# Patient Record
Sex: FEMALE | Race: WHITE | URBAN - METROPOLITAN AREA
[De-identification: names, ages, dates, MRNs, and addresses within clinical notes are randomized per-mention and may not be internally consistent; named-entity substitution may affect disease eponyms.]

---

## 2021-08-27 ENCOUNTER — OFFICE VISIT (OUTPATIENT)
Dept: URBAN - METROPOLITAN AREA CLINIC 33 | Facility: CLINIC | Age: 56
End: 2021-08-27

## 2021-08-27 DIAGNOSIS — H25.812 COMBINED FORMS OF AGE-RELATED CATARACT, LEFT EYE: ICD-10-CM

## 2021-08-27 PROCEDURE — 99204 OFFICE O/P NEW MOD 45 MIN: CPT | Performed by: OPHTHALMOLOGY

## 2021-08-27 ASSESSMENT — INTRAOCULAR PRESSURE
OS: 12
OD: 12

## 2021-08-27 ASSESSMENT — KERATOMETRY
OD: 42.50
OS: 42.88

## 2021-08-27 ASSESSMENT — VISUAL ACUITY
OD: 20/400
OS: 20/20

## 2021-08-27 NOTE — IMPRESSION/PLAN
Impression: Combined forms of age-related cataract, bilateral: H25.813. Condition: quality of life issue. Symptoms: could improve with surgery. Vision: vision affected. Plan: Cataracts account for the patient's complaints. Discussed all risks, benefits, procedures and recovery. Patient has quality of life issues. Patient understands changing glasses will not improve vision. Patient desires to have surgery, recommend phacoemulsification with intraocular lens. CE w/IOL OD then OS. R/B/A discussed. RL2. Lens: standard   Aim: plano. Dexycu  will be used.

## 2021-09-29 ENCOUNTER — TESTING ONLY (OUTPATIENT)
Dept: URBAN - METROPOLITAN AREA CLINIC 33 | Facility: CLINIC | Age: 56
End: 2021-09-29

## 2021-09-29 DIAGNOSIS — H25.813 COMBINED FORMS OF AGE-RELATED CATARACT, BILATERAL: Primary | ICD-10-CM

## 2021-09-29 PROCEDURE — 92025 CPTRIZED CORNEAL TOPOGRAPHY: CPT | Performed by: OPHTHALMOLOGY

## 2021-09-29 ASSESSMENT — PACHYMETRY
OS: 3.58
OS: 25.52
OD: 25.81
OD: 3.47

## 2021-10-11 ENCOUNTER — SURGERY (OUTPATIENT)
Dept: URBAN - METROPOLITAN AREA SURGERY 15 | Facility: SURGERY | Age: 56
End: 2021-10-11
Payer: COMMERCIAL

## 2021-10-11 PROCEDURE — 66984 XCAPSL CTRC RMVL W/O ECP: CPT | Performed by: OPHTHALMOLOGY

## 2021-10-12 ENCOUNTER — POST-OPERATIVE VISIT (OUTPATIENT)
Dept: URBAN - METROPOLITAN AREA CLINIC 33 | Facility: CLINIC | Age: 56
End: 2021-10-12

## 2021-10-12 ASSESSMENT — INTRAOCULAR PRESSURE
OD: 17
OS: 15

## 2021-10-12 NOTE — IMPRESSION/PLAN
Impression: S/P Cataract Extraction by phacoemulsification with IOL placement; ORA; DEXYCU OD - 1 Day. Encounter for surgical aftercare following surgery on a sense organ  Z48.810. Post operative instructions reviewed - Condition is improving. Discussed mild PC haze OD- will continue to monitor. Dilate OD at next appointment. Plan: --Advised patient to use artificial tears for comfort.

## 2021-10-19 ENCOUNTER — POST-OPERATIVE VISIT (OUTPATIENT)
Dept: URBAN - METROPOLITAN AREA CLINIC 33 | Facility: CLINIC | Age: 56
End: 2021-10-19

## 2021-10-19 DIAGNOSIS — Z48.810 ENCOUNTER FOR SURGICAL AFTERCARE FOLLOWING SURGERY ON A SENSE ORGAN: Primary | ICD-10-CM

## 2021-10-19 ASSESSMENT — INTRAOCULAR PRESSURE
OD: 11
OS: 13

## 2021-10-19 ASSESSMENT — VISUAL ACUITY: OD: 20/30

## 2021-10-19 NOTE — IMPRESSION/PLAN
Impression: S/P Cataract Extraction by phacoemulsification with IOL placement; ORA; DEXYCU OD - 8 Days. Encounter for surgical aftercare following surgery on a sense organ  Z48.810. Excellent post op course   Post operative instructions reviewed - Patient happy with vision OD Plan: Keep CE OS --Advised patient to use artificial tears for comfort.

## 2021-10-25 ENCOUNTER — SURGERY (OUTPATIENT)
Dept: URBAN - METROPOLITAN AREA SURGERY 15 | Facility: SURGERY | Age: 56
End: 2021-10-25
Payer: COMMERCIAL

## 2021-10-25 PROCEDURE — 66984 XCAPSL CTRC RMVL W/O ECP: CPT | Performed by: OPHTHALMOLOGY

## 2021-10-26 ENCOUNTER — POST-OPERATIVE VISIT (OUTPATIENT)
Dept: URBAN - METROPOLITAN AREA CLINIC 32 | Facility: CLINIC | Age: 56
End: 2021-10-26

## 2021-10-26 ASSESSMENT — INTRAOCULAR PRESSURE
OD: 15
OS: 20

## 2021-10-26 NOTE — IMPRESSION/PLAN
Impression: S/P Cataract Extraction by phacoemulsification with IOL placement; ORA; Dexycu OS - 1 Day. Presence of intraocular lens  Z96.1. Post operative instructions reviewed - Plan: The surgical eye(s) is improving well. Continue to follow current drop plan and post operative instructions. Recommend artificial tears throughout post operative period. RTC for scheduled follow up. Check [facility]. --Advised patient to use artificial tears for comfort.

## 2021-11-02 ENCOUNTER — POST-OPERATIVE VISIT (OUTPATIENT)
Dept: URBAN - METROPOLITAN AREA CLINIC 33 | Facility: CLINIC | Age: 56
End: 2021-11-02

## 2021-11-02 DIAGNOSIS — Z96.1 PRESENCE OF INTRAOCULAR LENS: Primary | ICD-10-CM

## 2021-11-02 DIAGNOSIS — H26.491 OTHER SECONDARY CATARACT, RIGHT EYE: ICD-10-CM

## 2021-11-02 ASSESSMENT — VISUAL ACUITY
OD: 20/25-
OS: 20/20

## 2021-11-02 ASSESSMENT — INTRAOCULAR PRESSURE
OD: 11
OS: 13

## 2021-11-02 NOTE — IMPRESSION/PLAN
Impression: S/P Cataract Extraction by phacoemulsification with IOL placement; ORA; Dexycu OS - 8 Days. Presence of intraocular lens  Z96.1. Excellent post op course   Post operative instructions reviewed - Condition is improving - Opacified Capsule OU. Plan: Discussed PCO OD. Recommend YAG OD. R/B/A discussed and understood by patient and patient elects to proceed as indicated. RL-2 --Advised patient to use artificial tears for comfort.

## 2021-11-16 ENCOUNTER — SURGERY (OUTPATIENT)
Dept: URBAN - METROPOLITAN AREA SURGERY 15 | Facility: SURGERY | Age: 56
End: 2021-11-16

## 2021-11-16 PROCEDURE — 66821 AFTER CATARACT LASER SURGERY: CPT | Performed by: OPHTHALMOLOGY

## 2023-01-11 NOTE — IMPRESSION/PLAN
Impression: Horseshoe tear of retina without detachment, left eye: H33.312. Left. Condition: new problem addtl w/u needed. Vision: vision affected. Plan: Discussed diagnosis with patient. Gonio OS shows sup lattice, chorioretinal scar, 2 holes temp 3 o'clock lattice, inf VH. Discussed risks of progression. Recommend Laser Photocoagulation LEFT EYE for Retinal Tear Repair to reduce the risk of Retinal Detachment. Discussed the risks and benefits of laser treatment. All questions answered. Advised patient to come in ASAP if she notices any vision loss. No restrictions after laser tx. Patient elects to proceed with recommendation. RL1. Will proceed w/ PCA OS today.

## 2023-01-11 NOTE — IMPRESSION/PLAN
Impression: Vitreous degeneration, right eye: H43.811. Plan: Patient educated on ocular findings. Discussed symptoms of retinal detachment. Recommended patient to return to clinic if he/she notes new onset of floaters, flashes of light, and/or curtain of veil over vision immediately. Continue to monitor.

## 2023-01-11 NOTE — IMPRESSION/PLAN
Impression: Presence of intraocular lens: Z96.1 OS. Plan: Patient educated on findings. Continue to monitor.

## 2023-01-11 NOTE — IMPRESSION/PLAN
Impression: Unspecified retinal break, right eye: H33.301. -optos taken
-temporal retinal break
-questionable inferior retinal break Plan: Patient educated on ocular findings. Discussed the natural history and the risks and benefit of repairing retinal break. Patient will see retina specialist today. . Discussed symptoms of retinal detachment. Recommended patient to return to clinic if he/she notes new onset of floaters, flashes of light, and/or curtain of veil over vision immediately.

## 2023-01-11 NOTE — IMPRESSION/PLAN
Impression: Puckering of macula, right eye: H35.371.
-MAC OCT ordered showing ERM OD Plan: Patient educated on findings. Continue to monitor.

## 2023-01-18 NOTE — IMPRESSION/PLAN
Impression: S/P PCA (Photocoagulation for retinal tear laser) OS - 7 Days. Encounter for surgical aftercare following surgery on a sense organ  Z48.810. Tear well sealed OS. Follow up in 3 months for dilation and Optos. -Advised patient to call if any issues arise Plan: --Advised patient to use artificial tears for comfort.

## 2023-04-19 NOTE — IMPRESSION/PLAN
Impression: Horseshoe tear of retina without detachment, left eye: H33.312. Left. Condition: new problem addtl w/u needed. Vision: vision affected. Plan: S/P HST OS repaired by PCA laser, 01/11/23 by Dr. Brock Muhammad. Well sealed tear. No further treatment needed. Advised patient to call if any symptoms develop.